# Patient Record
Sex: FEMALE | Race: OTHER
[De-identification: names, ages, dates, MRNs, and addresses within clinical notes are randomized per-mention and may not be internally consistent; named-entity substitution may affect disease eponyms.]

---

## 2022-11-04 ENCOUNTER — HOSPITAL ENCOUNTER (INPATIENT)
Dept: HOSPITAL 93 - ER | Age: 72
LOS: 4 days | Discharge: NURSING, REHAB OR LONG-TERM CARE | DRG: 482 | End: 2022-11-08
Attending: INTERNAL MEDICINE | Admitting: INTERNAL MEDICINE
Payer: COMMERCIAL

## 2022-11-04 VITALS — HEIGHT: 59 IN | BODY MASS INDEX: 23.79 KG/M2 | WEIGHT: 118 LBS
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
## 2022-11-04 DIAGNOSIS — E11.9: ICD-10-CM
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
## 2022-11-04 DIAGNOSIS — Z79.4: ICD-10-CM

## 2022-11-04 DIAGNOSIS — I10: ICD-10-CM

## 2022-11-04 DIAGNOSIS — D64.9: ICD-10-CM

## 2022-11-04 DIAGNOSIS — I73.9: ICD-10-CM

## 2022-11-04 DIAGNOSIS — S72.142A: Primary | ICD-10-CM

## 2022-11-04 DIAGNOSIS — M19.90: ICD-10-CM

## 2022-11-04 NOTE — NUR
PTE ALERTA Y ORIENTADA POR LATESHA ESFERAS CON BUEN PATRON RESPIRATORIO. VIENE EN 
LORAINE POR AMBULANCIA, REFIERE QUE EN EL MARIA G DE PAOLA SE CARA EN EL HOGAR Y SE
FRACTURO CADERA IZQUIERDA. PTE ES TRANSFER DEL [facility]
ACEPTADO POR DR.IVAN WOOD. PTE CON CANALIZACION EN BRAZO SMITHA Y [place]
DEL [facility].

## 2022-11-04 NOTE — NUR
SE RECIBE PTE FEMENINA DE 72 ANOS ALERTA Y ORIENTADO X3 AL MOMENTO SE OBSERBA
EN DESCANSO EN CAMA. PTE SE OBSERBA PTE CON INFUCION DE 0.9% NSS A 125 ML/HR.
PTE AL MOMENTO PEND A CONSULTA CON  IRRIZARY POR FRACTURA DE CADERA.

## 2022-11-05 PROCEDURE — 0QSC06Z REPOSITION LEFT LOWER FEMUR WITH INTRAMEDULLARY INTERNAL FIXATION DEVICE, OPEN APPROACH: ICD-10-PCS | Performed by: ORTHOPAEDIC SURGERY

## 2022-11-05 PROCEDURE — 0QR90JZ REPLACEMENT OF LEFT FEMORAL SHAFT WITH SYNTHETIC SUBSTITUTE, OPEN APPROACH: ICD-10-PCS | Performed by: ORTHOPAEDIC SURGERY

## 2022-11-06 PROCEDURE — 30233N1 TRANSFUSION OF NONAUTOLOGOUS RED BLOOD CELLS INTO PERIPHERAL VEIN, PERCUTANEOUS APPROACH: ICD-10-PCS | Performed by: INTERNAL MEDICINE

## 2022-12-07 ENCOUNTER — HOSPITAL ENCOUNTER (OUTPATIENT)
Dept: HOSPITAL 93 - RAD | Age: 72
Discharge: HOME | End: 2022-12-07
Attending: ORTHOPAEDIC SURGERY
Payer: COMMERCIAL

## 2022-12-07 DIAGNOSIS — S72.042A: Primary | ICD-10-CM

## 2023-02-07 ENCOUNTER — HOSPITAL ENCOUNTER (OUTPATIENT)
Dept: HOSPITAL 93 - RAD | Age: 73
Discharge: HOME | End: 2023-02-07
Attending: ORTHOPAEDIC SURGERY
Payer: COMMERCIAL

## 2023-02-07 DIAGNOSIS — S72.042D: Primary | ICD-10-CM
